# Patient Record
Sex: MALE | Race: WHITE | NOT HISPANIC OR LATINO | Employment: FULL TIME | URBAN - METROPOLITAN AREA
[De-identification: names, ages, dates, MRNs, and addresses within clinical notes are randomized per-mention and may not be internally consistent; named-entity substitution may affect disease eponyms.]

---

## 2023-02-02 PROBLEM — H52.7 REFRACTIVE ERROR: Status: ACTIVE | Noted: 2023-02-02

## 2023-02-02 PROBLEM — K21.00 CHRONIC REFLUX ESOPHAGITIS: Status: ACTIVE | Noted: 2023-02-02

## 2023-02-02 PROBLEM — H35.373 EPIRETINAL MEMBRANE (ERM) OF BOTH EYES: Status: ACTIVE | Noted: 2023-02-02

## 2023-02-02 PROBLEM — H52.13 MYOPIA OF BOTH EYES WITH ASTIGMATISM AND PRESBYOPIA: Status: ACTIVE | Noted: 2023-02-02

## 2023-02-02 PROBLEM — H52.203 MYOPIA OF BOTH EYES WITH ASTIGMATISM AND PRESBYOPIA: Status: ACTIVE | Noted: 2023-02-02

## 2023-02-02 PROBLEM — H90.A22 SENSORINEURAL HEARING LOSS (SNHL) OF LEFT EAR WITH RESTRICTED HEARING OF RIGHT EAR: Status: ACTIVE | Noted: 2023-02-02

## 2023-02-02 PROBLEM — J30.9 ALLERGIC RHINITIS: Status: ACTIVE | Noted: 2023-02-02

## 2023-02-02 PROBLEM — H52.13 BILATERAL MYOPIA: Status: ACTIVE | Noted: 2023-02-02

## 2023-02-02 PROBLEM — H52.203 ASTIGMATISM, BILATERAL: Status: ACTIVE | Noted: 2023-02-02

## 2023-02-02 PROBLEM — K21.9 GERD WITHOUT ESOPHAGITIS: Status: ACTIVE | Noted: 2023-02-02

## 2023-02-02 PROBLEM — R42 VERTIGO: Status: ACTIVE | Noted: 2023-02-02

## 2023-02-02 PROBLEM — H90.3 BILATERAL SENSORINEURAL HEARING LOSS: Status: ACTIVE | Noted: 2023-02-02

## 2023-02-02 PROBLEM — R53.83 FATIGUE: Status: ACTIVE | Noted: 2023-02-02

## 2023-02-02 PROBLEM — H93.19 SUBJECTIVE TINNITUS: Status: ACTIVE | Noted: 2023-02-02

## 2023-02-02 PROBLEM — H43.811 PVD (POSTERIOR VITREOUS DETACHMENT), RIGHT EYE: Status: ACTIVE | Noted: 2023-02-02

## 2023-02-02 PROBLEM — H93.12 SUBJECTIVE TINNITUS OF LEFT EAR: Status: ACTIVE | Noted: 2023-02-02

## 2023-02-02 PROBLEM — H61.21 IMPACTED CERUMEN OF RIGHT EAR: Status: ACTIVE | Noted: 2023-02-02

## 2023-02-02 PROBLEM — N41.0 ACUTE PROSTATITIS: Status: ACTIVE | Noted: 2023-02-02

## 2023-02-02 PROBLEM — R07.9 CHEST PAIN: Status: ACTIVE | Noted: 2023-02-02

## 2023-02-02 PROBLEM — H93.8X2 FULLNESS IN EAR, LEFT: Status: ACTIVE | Noted: 2023-02-02

## 2023-02-02 PROBLEM — K21.9 GERD (GASTROESOPHAGEAL REFLUX DISEASE): Status: ACTIVE | Noted: 2023-02-02

## 2023-02-02 PROBLEM — H43.813 PVD (POSTERIOR VITREOUS DETACHMENT), BOTH EYES: Status: ACTIVE | Noted: 2023-02-02

## 2023-02-02 PROBLEM — H52.4 MYOPIA OF BOTH EYES WITH ASTIGMATISM AND PRESBYOPIA: Status: ACTIVE | Noted: 2023-02-02

## 2023-02-02 PROBLEM — H52.4 BILATERAL PRESBYOPIA: Status: ACTIVE | Noted: 2023-02-02

## 2023-02-02 PROBLEM — H35.371 MACULAR PUCKER, RIGHT EYE: Status: ACTIVE | Noted: 2023-02-02

## 2023-02-02 PROBLEM — H81.02: Status: ACTIVE | Noted: 2023-02-02

## 2023-02-02 RX ORDER — OMEPRAZOLE 20 MG/1
1 CAPSULE, DELAYED RELEASE ORAL DAILY
COMMUNITY
Start: 2014-01-07

## 2023-03-16 ENCOUNTER — OFFICE VISIT (OUTPATIENT)
Dept: PRIMARY CARE | Facility: CLINIC | Age: 63
End: 2023-03-16
Payer: COMMERCIAL

## 2023-03-16 VITALS — SYSTOLIC BLOOD PRESSURE: 126 MMHG | DIASTOLIC BLOOD PRESSURE: 73 MMHG | WEIGHT: 162 LBS | BODY MASS INDEX: 23.92 KG/M2

## 2023-03-16 DIAGNOSIS — K21.9 GERD WITHOUT ESOPHAGITIS: ICD-10-CM

## 2023-03-16 DIAGNOSIS — Z00.00 HEALTH CARE MAINTENANCE: Primary | ICD-10-CM

## 2023-03-16 DIAGNOSIS — E78.2 MIXED HYPERLIPIDEMIA: ICD-10-CM

## 2023-03-16 LAB
ANION GAP IN SER/PLAS: 13 MMOL/L (ref 10–20)
CALCIDIOL (25 OH VITAMIN D3) (NG/ML) IN SER/PLAS: 73 NG/ML
CALCIUM (MG/DL) IN SER/PLAS: 10 MG/DL (ref 8.6–10.6)
CARBON DIOXIDE, TOTAL (MMOL/L) IN SER/PLAS: 31 MMOL/L (ref 21–32)
CHLORIDE (MMOL/L) IN SER/PLAS: 101 MMOL/L (ref 98–107)
CHOLESTEROL (MG/DL) IN SER/PLAS: 178 MG/DL (ref 0–199)
CHOLESTEROL IN HDL (MG/DL) IN SER/PLAS: 44.3 MG/DL
CHOLESTEROL/HDL RATIO: 4
CREATININE (MG/DL) IN SER/PLAS: 1.14 MG/DL (ref 0.5–1.3)
ERYTHROCYTE DISTRIBUTION WIDTH (RATIO) BY AUTOMATED COUNT: 11.9 % (ref 11.5–14.5)
ERYTHROCYTE MEAN CORPUSCULAR HEMOGLOBIN CONCENTRATION (G/DL) BY AUTOMATED: 33.8 G/DL (ref 32–36)
ERYTHROCYTE MEAN CORPUSCULAR VOLUME (FL) BY AUTOMATED COUNT: 93 FL (ref 80–100)
ERYTHROCYTES (10*6/UL) IN BLOOD BY AUTOMATED COUNT: 5.12 X10E12/L (ref 4.5–5.9)
GFR MALE: 73 ML/MIN/1.73M2
GLUCOSE (MG/DL) IN SER/PLAS: 91 MG/DL (ref 74–99)
HEMATOCRIT (%) IN BLOOD BY AUTOMATED COUNT: 47.6 % (ref 41–52)
HEMOGLOBIN (G/DL) IN BLOOD: 16.1 G/DL (ref 13.5–17.5)
LDL: 82 MG/DL (ref 0–99)
LEUKOCYTES (10*3/UL) IN BLOOD BY AUTOMATED COUNT: 5.2 X10E9/L (ref 4.4–11.3)
NON HDL CHOLESTEROL: 134 MG/DL
NRBC (PER 100 WBCS) BY AUTOMATED COUNT: 0 /100 WBC (ref 0–0)
PLATELETS (10*3/UL) IN BLOOD AUTOMATED COUNT: 206 X10E9/L (ref 150–450)
POTASSIUM (MMOL/L) IN SER/PLAS: 4.3 MMOL/L (ref 3.5–5.3)
PROSTATE SPECIFIC ANTIGEN,SCREEN: 0.49 NG/ML (ref 0–4)
SODIUM (MMOL/L) IN SER/PLAS: 141 MMOL/L (ref 136–145)
TRIGLYCERIDE (MG/DL) IN SER/PLAS: 258 MG/DL (ref 0–149)
UREA NITROGEN (MG/DL) IN SER/PLAS: 16 MG/DL (ref 6–23)
VLDL: 52 MG/DL (ref 0–40)

## 2023-03-16 PROCEDURE — 84153 ASSAY OF PSA TOTAL: CPT

## 2023-03-16 PROCEDURE — 80061 LIPID PANEL: CPT

## 2023-03-16 PROCEDURE — 99396 PREV VISIT EST AGE 40-64: CPT | Performed by: INTERNAL MEDICINE

## 2023-03-16 PROCEDURE — 80048 BASIC METABOLIC PNL TOTAL CA: CPT

## 2023-03-16 PROCEDURE — 82306 VITAMIN D 25 HYDROXY: CPT

## 2023-03-16 PROCEDURE — 85027 COMPLETE CBC AUTOMATED: CPT

## 2023-03-16 PROCEDURE — 93000 ELECTROCARDIOGRAM COMPLETE: CPT | Performed by: INTERNAL MEDICINE

## 2023-03-16 PROCEDURE — 36415 COLL VENOUS BLD VENIPUNCTURE: CPT | Performed by: INTERNAL MEDICINE

## 2023-03-16 NOTE — PROGRESS NOTES
Subjective   Patient ID: Vahe Phillips is a 62 y.o. male who presents for No chief complaint on file..    HPI CPE see updated front sheet no chest pain no shortness of breath feels he cannot be without the omeprazole it does help him no side effect with medication bowels normal no dysuria bones muscles joints okay occasionally will have a tremor in the morning but not throughout the day has some persistent postnasal drip    Past medical history hyperlipidemia    Medication omeprazole flavonoids creatine    Allergies noted and unchanged    Social history no tobacco no alcohol rare    Family history noted and unchanged    Prevention exercising vigorously prior blood work reviewed colonoscopy every 5 years    Review of Systems    Objective   There were no vitals taken for this visit.    Physical Exam vital signs noted alert and oriented x3 NCAT PERRLA EOMI nares without discharge OP benign TM normal bilateral EAC clear bilateral minimal cerumen on right no AC nodes no JVD or bruit no thyromegaly chest clear to auscultation and percussion CV regular rate and rhythm S1-S2 without murmur gallop or rub abdomen soft nontender normal active bowel sounds no rebound or guarding no HSM LS spine normal curvature negative straight leg raise negative logroll negative SI joint tenderness extremities no clubbing cyanosis or edema normal distal pulses DTR 2+ impression General medical examination    Assessment/Plan General medical examination check EKG advised on heart check Chem-7 advised on glucose potassium and kidney function postnasal drip tremor hyperlipidemia GERD  Plan advised on increase water consumption with the [employer] of the creatine check CBC advised on blood count follow-up for next colonoscopy check lipid panel advised on cholesterol profile check vitamin D level by request advised on supplementation check PSA advised on prostate no intervention is needed for the minimal brief tremor no medication is actually needed  for the postnasal drip is he is content without medication does not seem to be allergic in nature may revisit continue with PPI and avoid NSAIDs recheck 6 months based on above TT 60 cc 31

## 2023-03-29 ENCOUNTER — TELEMEDICINE (OUTPATIENT)
Dept: PRIMARY CARE | Facility: CLINIC | Age: 63
End: 2023-03-29
Payer: COMMERCIAL

## 2023-03-29 DIAGNOSIS — R73.09 ELEVATED GLUCOSE LEVEL: ICD-10-CM

## 2023-03-29 DIAGNOSIS — E78.1 HYPERTRIGLYCERIDEMIA: Primary | ICD-10-CM

## 2023-03-29 PROCEDURE — 99442 PR PHYS/QHP TELEPHONE EVALUATION 11-20 MIN: CPT | Performed by: INTERNAL MEDICINE

## 2023-03-29 NOTE — PROGRESS NOTES
Subjective   Patient ID: Vahe Phillips is a 62 y.o. male who presents for No chief complaint on file..    HPI patient initiated telehealth visit this visit was completed via telephone secondary to the restrictions of the COVID-19 pandemic all medical issues were addressed and discussed with patient patient was not otherwise examined if it was felt that the patient needed to be seen in the office they would have been referred to do so patient verbally consented to visit  Sick visit discussed with cholesterol VLDL total cholesterol and triglycerides as well as previously slightly elevated glucose had come into line    Review of Systems    Objective   There were no vitals taken for this visit.    Physical Exam alert and oriented x3 breathing unlabored not otherwise examined    Assessment/Plan impression glucose diagnosis if any cholesterol diagnoses       Plan continue good diet watching cho and may recheck glucose in the future increase aerobic exercise weight control good water consumption recheck on lipids discussed with medications and non pharm for now and follow-up more regularly

## 2023-12-14 ENCOUNTER — OFFICE VISIT (OUTPATIENT)
Dept: OPHTHALMOLOGY | Facility: CLINIC | Age: 63
End: 2023-12-14
Payer: COMMERCIAL

## 2023-12-14 DIAGNOSIS — H35.371 MACULAR PUCKER, RIGHT EYE: Primary | ICD-10-CM

## 2023-12-14 DIAGNOSIS — H52.13 BILATERAL MYOPIA: ICD-10-CM

## 2023-12-14 DIAGNOSIS — H52.223 REGULAR ASTIGMATISM OF BOTH EYES: ICD-10-CM

## 2023-12-14 DIAGNOSIS — H35.373 EPIRETINAL MEMBRANE (ERM) OF BOTH EYES: ICD-10-CM

## 2023-12-14 DIAGNOSIS — H43.813 PVD (POSTERIOR VITREOUS DETACHMENT), BOTH EYES: ICD-10-CM

## 2023-12-14 PROCEDURE — 92134 CPTRZ OPH DX IMG PST SGM RTA: CPT | Mod: BILATERAL PROCEDURE | Performed by: OPTOMETRIST

## 2023-12-14 PROCEDURE — 92015 DETERMINE REFRACTIVE STATE: CPT | Performed by: OPTOMETRIST

## 2023-12-14 PROCEDURE — 92014 COMPRE OPH EXAM EST PT 1/>: CPT | Performed by: OPTOMETRIST

## 2023-12-14 ASSESSMENT — ENCOUNTER SYMPTOMS
CONSTITUTIONAL NEGATIVE: 0
NEUROLOGICAL NEGATIVE: 0
MUSCULOSKELETAL NEGATIVE: 0
RESPIRATORY NEGATIVE: 0
PSYCHIATRIC NEGATIVE: 0
ENDOCRINE NEGATIVE: 0
GASTROINTESTINAL NEGATIVE: 0
ALLERGIC/IMMUNOLOGIC NEGATIVE: 0
CARDIOVASCULAR NEGATIVE: 0
HEMATOLOGIC/LYMPHATIC NEGATIVE: 0
EYES NEGATIVE: 0

## 2023-12-14 ASSESSMENT — VISUAL ACUITY
OD_SC: 20/25-
METHOD: SNELLEN - LINEAR
OS_SC: 20/25-1

## 2023-12-14 ASSESSMENT — CONF VISUAL FIELD
OS_INFERIOR_NASAL_RESTRICTION: 0
OS_SUPERIOR_NASAL_RESTRICTION: 0
OS_NORMAL: 1
OD_NORMAL: 1
OD_SUPERIOR_TEMPORAL_RESTRICTION: 0
OD_INFERIOR_NASAL_RESTRICTION: 0
OD_INFERIOR_TEMPORAL_RESTRICTION: 0
OS_INFERIOR_TEMPORAL_RESTRICTION: 0
OD_SUPERIOR_NASAL_RESTRICTION: 0
OS_SUPERIOR_TEMPORAL_RESTRICTION: 0
METHOD: COUNTING FINGERS

## 2023-12-14 ASSESSMENT — EXTERNAL EXAM - RIGHT EYE: OD_EXAM: NORMAL

## 2023-12-14 ASSESSMENT — CUP TO DISC RATIO
OD_RATIO: .3
OS_RATIO: .3

## 2023-12-14 ASSESSMENT — REFRACTION_MANIFEST
OD_SPHERE: +0.25
OS_ADD: +2.50
OD_AXIS: 070
OD_ADD: +2.50
OS_SPHERE: +0.25
OS_CYLINDER: -0.75
OD_SPHERE: +0.25
METHOD_AUTOREFRACTION: 1
OS_AXIS: 153
OD_CYLINDER: -0.50
OS_CYLINDER: --0.75
OD_CYLINDER: -0.25
OS_SPHERE: PLANO
OD_AXIS: 004
OS_AXIS: 151

## 2023-12-14 ASSESSMENT — TONOMETRY
OS_IOP_MMHG: 16
IOP_METHOD: GOLDMANN APPLANATION
OD_IOP_MMHG: 16

## 2023-12-14 ASSESSMENT — SLIT LAMP EXAM - LIDS
COMMENTS: GOOD POSITION
COMMENTS: GOOD POSITION

## 2023-12-14 ASSESSMENT — EXTERNAL EXAM - LEFT EYE: OS_EXAM: NORMAL

## 2023-12-14 NOTE — PROGRESS NOTES
VA stable at 20/20 O and OS.  ERM stable and Optical coherence tomography of the macula revealed:    OD: ERM, abnormal foveal contour, photoreceptor, retinal pigment epithelium, IS/OS junction, central field 448 was 438 was 446 was 439 micron.  Vitreous base NOT visualized.   OS: ERM, abnormal foveal contour, photoreceptor, retinal pigment epithelium, IS/OS junction, central field 427 was 404 was 388 was 380 micron.  Vitreous base NOT visualized.   VA tests at 20/25 OD and 20/20 OS, 20/20 in each eye.  Mild subjective symptoms associated.  NS minimal.  A spectacle prescription was deferred at the patient`s request.  Patient may not fill as he sees well without.  The patient was asked to return to our clinic in one year   or sooner if ocular or vision changes occur.     C/O dry eyes start iVizia 2-4x/day.     1 year VA MR and DFE and OCT macula.

## 2024-03-01 ENCOUNTER — APPOINTMENT (OUTPATIENT)
Dept: OPHTHALMOLOGY | Facility: CLINIC | Age: 64
End: 2024-03-01
Payer: COMMERCIAL

## 2024-04-05 ENCOUNTER — APPOINTMENT (OUTPATIENT)
Dept: OPHTHALMOLOGY | Facility: CLINIC | Age: 64
End: 2024-04-05
Payer: COMMERCIAL

## 2024-04-16 ENCOUNTER — LAB (OUTPATIENT)
Dept: LAB | Facility: LAB | Age: 64
End: 2024-04-16
Payer: COMMERCIAL

## 2024-04-16 ENCOUNTER — OFFICE VISIT (OUTPATIENT)
Dept: PRIMARY CARE | Facility: CLINIC | Age: 64
End: 2024-04-16
Payer: COMMERCIAL

## 2024-04-16 VITALS — BODY MASS INDEX: 23.92 KG/M2 | WEIGHT: 162 LBS | SYSTOLIC BLOOD PRESSURE: 114 MMHG | DIASTOLIC BLOOD PRESSURE: 68 MMHG

## 2024-04-16 DIAGNOSIS — K21.9 GERD WITHOUT ESOPHAGITIS: ICD-10-CM

## 2024-04-16 DIAGNOSIS — Z00.00 HEALTH CARE MAINTENANCE: Primary | ICD-10-CM

## 2024-04-16 DIAGNOSIS — Z00.00 HEALTH CARE MAINTENANCE: ICD-10-CM

## 2024-04-16 DIAGNOSIS — J30.1 ALLERGIC RHINITIS DUE TO POLLEN, UNSPECIFIED SEASONALITY: ICD-10-CM

## 2024-04-16 DIAGNOSIS — E78.2 MIXED HYPERLIPIDEMIA: ICD-10-CM

## 2024-04-16 LAB
CHOLEST SERPL-MCNC: 182 MG/DL (ref 0–199)
CHOLESTEROL/HDL RATIO: 4.3
HDLC SERPL-MCNC: 42.6 MG/DL
LDLC SERPL CALC-MCNC: 93 MG/DL
NON HDL CHOLESTEROL: 139 MG/DL (ref 0–149)
TRIGL SERPL-MCNC: 232 MG/DL (ref 0–149)
VLDL: 46 MG/DL (ref 0–40)

## 2024-04-16 PROCEDURE — 99396 PREV VISIT EST AGE 40-64: CPT | Performed by: INTERNAL MEDICINE

## 2024-04-16 PROCEDURE — 80061 LIPID PANEL: CPT

## 2024-04-16 PROCEDURE — 85027 COMPLETE CBC AUTOMATED: CPT

## 2024-04-16 PROCEDURE — 84153 ASSAY OF PSA TOTAL: CPT

## 2024-04-16 PROCEDURE — 80048 BASIC METABOLIC PNL TOTAL CA: CPT

## 2024-04-16 PROCEDURE — 36415 COLL VENOUS BLD VENIPUNCTURE: CPT

## 2024-04-16 NOTE — PROGRESS NOTES
Subjective   Patient ID: Vahe Phillips is a 63 y.o. female who presents for No chief complaint on file..    HPI CPE see updated front sheet some dry eyes and some postnasal drip has been using Systane and Zyrtec with some success stomach has been okay using omeprazole twice per week occasionally Tums or Pepcid bowels normal sometimes up at night to urinate swallowing has been okay bones muscles joints okay    Past medical history hyperlipidemia GERD    Medication omeprazole flavonoids creatine Pepcid times    Allergies noted and unchanged    Social history no tobacco no alcohol rare    Family history noted and unchanged    Prevention exercising vigorously prior blood work reviewed colonoscopy every 5 years has had he GD    Review of Systems    Objective   There were no vitals taken for this visit.    Physical Exam vital signs noted alert and oriented x3 NCAT PERRLA EOMI eyes not completely dry nares clear discharge OP benign TM normal bilateral EAC clear bilateral no AC nodes no JVD or bruit no thyromegaly chest clear to auscultation and percussion CV regular rate and rhythm S1-S2 without murmur gallop or rub abdomen soft nontender normal active bowel sounds no rebound or guarding no HSM LS spine normal curvature negative straight leg raise negative logroll negative SI joint tenderness extremities no clubbing cyanosis or edema normal distal pulses DTR 2+     Assessment/Plan impression General medical examination GERD hyperlipidemia dry eyes allergic rhinitis  Plan check coronary artery calcium CT score requisition made advised on heart check Chem-7 advised on glucose potassium and kidney function postnasal drip okay for Zyrtec continue with the Systane follow-up with ophthalmology good diet regular exercise increase water consumption decrease water consumption at night for the prostate check CBC advised on blood count follow-up for next colonoscopy and EGD check lipid panel advised on cholesterol profile check PSA  advised on prostate no intervention is needed for the prostate continue with PPI and avoid NSAIDs recheck 6 months based on above TT 60 cc 31

## 2024-04-18 ENCOUNTER — APPOINTMENT (OUTPATIENT)
Dept: OPHTHALMOLOGY | Facility: CLINIC | Age: 64
End: 2024-04-18
Payer: COMMERCIAL

## 2024-04-19 LAB
ANION GAP SERPL CALC-SCNC: 13 MMOL/L (ref 10–20)
BUN SERPL-MCNC: 15 MG/DL (ref 6–23)
CALCIUM SERPL-MCNC: 9.1 MG/DL (ref 8.6–10.6)
CHLORIDE SERPL-SCNC: 105 MMOL/L (ref 98–107)
CO2 SERPL-SCNC: 29 MMOL/L (ref 21–32)
CREAT SERPL-MCNC: 1.03 MG/DL (ref 0.5–1.3)
EGFRCR SERPLBLD CKD-EPI 2021: 82 ML/MIN/1.73M*2
ERYTHROCYTE [DISTWIDTH] IN BLOOD BY AUTOMATED COUNT: 11.8 % (ref 11.5–14.5)
GLUCOSE SERPL-MCNC: 105 MG/DL (ref 74–99)
HCT VFR BLD AUTO: 47 % (ref 41–52)
HGB BLD-MCNC: 16.2 G/DL (ref 13.5–17.5)
MCH RBC QN AUTO: 32.5 PG (ref 26–34)
MCHC RBC AUTO-ENTMCNC: 34.5 G/DL (ref 32–36)
MCV RBC AUTO: 94 FL (ref 80–100)
NRBC BLD-RTO: 0 /100 WBCS (ref 0–0)
PLATELET # BLD AUTO: 191 X10*3/UL (ref 150–450)
POTASSIUM SERPL-SCNC: 4.6 MMOL/L (ref 3.5–5.3)
PSA SERPL-MCNC: 0.63 NG/ML
RBC # BLD AUTO: 4.99 X10*6/UL (ref 4.5–5.9)
SODIUM SERPL-SCNC: 142 MMOL/L (ref 136–145)
WBC # BLD AUTO: 4.2 X10*3/UL (ref 4.4–11.3)

## 2024-05-01 ENCOUNTER — DOCUMENTATION (OUTPATIENT)
Dept: OPHTHALMOLOGY | Facility: CLINIC | Age: 64
End: 2024-05-01
Payer: COMMERCIAL

## 2024-05-03 ENCOUNTER — APPOINTMENT (OUTPATIENT)
Dept: OPHTHALMOLOGY | Facility: CLINIC | Age: 64
End: 2024-05-03
Payer: COMMERCIAL

## 2024-07-09 ENCOUNTER — HOSPITAL ENCOUNTER (OUTPATIENT)
Dept: RADIOLOGY | Facility: HOSPITAL | Age: 64
Discharge: HOME | End: 2024-07-09
Payer: COMMERCIAL

## 2024-07-09 DIAGNOSIS — E78.2 MIXED HYPERLIPIDEMIA: ICD-10-CM

## 2024-07-09 DIAGNOSIS — Z00.00 HEALTH CARE MAINTENANCE: ICD-10-CM

## 2024-07-09 PROCEDURE — 75571 CT HRT W/O DYE W/CA TEST: CPT

## 2024-07-15 ENCOUNTER — APPOINTMENT (OUTPATIENT)
Dept: RADIOLOGY | Facility: HOSPITAL | Age: 64
End: 2024-07-15
Payer: COMMERCIAL

## 2024-07-23 ENCOUNTER — APPOINTMENT (OUTPATIENT)
Dept: PRIMARY CARE | Facility: CLINIC | Age: 64
End: 2024-07-23
Payer: COMMERCIAL

## 2024-07-23 VITALS — SYSTOLIC BLOOD PRESSURE: 123 MMHG | DIASTOLIC BLOOD PRESSURE: 72 MMHG

## 2024-07-23 DIAGNOSIS — E78.2 MIXED HYPERLIPIDEMIA: ICD-10-CM

## 2024-07-23 DIAGNOSIS — K76.89 HEPATIC CYST: ICD-10-CM

## 2024-07-23 DIAGNOSIS — R93.1 ELEVATED CORONARY ARTERY CALCIUM SCORE: Primary | ICD-10-CM

## 2024-07-23 PROCEDURE — 99213 OFFICE O/P EST LOW 20 MIN: CPT | Performed by: INTERNAL MEDICINE

## 2024-07-23 NOTE — PROGRESS NOTES
Subjective   Patient ID: Vahe Phillips is a 63 y.o. male who presents for No chief complaint on file..    HPI blood pressureFollow-up visit no chest pain no shortness of breath no side effect with medication discussed with prior sugar glucose and cholesterol levels as well as coronary artery calcium CT score and findings in the liver    Review of Systems    Objective   There were no vitals taken for this visit.    Physical Exam vital signs noted alert and oriented x 3 NCAT no JVD or bruit chest clear to auscultation CV regular rate and rhythm S1-S2 extremities no clubbing cyanosis or edema normal distal pulses    Assessment/Plan        Impression hyperlipidemia increase coronary artery calcium CT score hepatic cyst?  Plan continue to watch diet regular exercise follow-up with cardiology requisition made we reviewed the hepatic portion of the study and scheduled for ultrasound advised on the potential for medication or other tests or intervention and is okay with proceeding    Finish hepatic ultrasound?

## 2024-09-13 NOTE — PROGRESS NOTES
"Primary Care Physician: Nghia Dominique MD  Date of Visit: 09/18/2024  8:00 AM EDT      Chief Complaint:   Pt referred by Dr. Dominique for high coronary calcium score     HPI / Summary:   Vahe Phillips is a 63 y.o. male with GERD, CAD based on elevated calcium score    Returns for further evaluation.  I saw him once in 2020 for atypical chest pain that was felt to be NEETA related as he had stopped his omeprazole.  He reports overall feeling quite good.  Denies any chest pain or pressure.  No shortness of breath, lightheadedness, dizziness, presyncope or syncope.    \"I'm not a big pill person\"  Takes omeprazole for reflux PRN  Exercise: Yoga, light lifting, light cardio. Takes creatine    Last Cardiology Tests:  ECG: 3/16/23  9/18/2024: NSR, normal ECG  Echo:   None  Cath:  None    Stress Test:  None  Cardiac Imaging: Calcium Scoring 7/9/24  1. Coronary artery calcium score of 239.6 (, LCx 13, RCA 9)        Past Medical History:  Past Medical History:   Diagnosis Date    Cataract     Personal history of other diseases of the digestive system     History of esophageal reflux        Past Surgical History:  Past Surgical History:   Procedure Laterality Date    KNEE SURGERY  12/02/2014    Knee Surgery    OTHER SURGICAL HISTORY  12/02/2014    Blood Vessel Repair Direct Leg          Social History:  He has no history on file for tobacco use, alcohol use, and drug use.    Family History:  family history includes Cataracts in his father and mother; Diabetes in his father.      Allergies:  Allergies   Allergen Reactions    Nut - Unspecified Unknown    Pollen Extracts Unknown       Outpatient Medications:  Current Outpatient Medications   Medication Instructions    omeprazole (PriLOSEC) 20 mg DR capsule 1 capsule, oral, Daily       Review of Systems:  A complete 10 point ROS was performed and is negative except for HPI    Physical Exam:  GENERAL: alert, cooperative, pleasant, in no acute distress  SKIN: warm, dry, no " "rash.  NECK: no JVD, no GERA  CARDIAC: Regular rate and rhythm with no rubs, murmurs, or gallops  CHEST: Normal respiratory efforts, lungs clear to auscultation bilaterally.  ABDOMEN: soft, nontender, nondistended  EXTREMITIES: no edema  NEURO: Alert and oriented x 3.  Grossly normal.  Moves all 4 extremities.    Vitals:    09/18/24 0813   BP: 143/80   BP Location: Left arm   Pulse: 58   SpO2: 98%   Weight: 72.6 kg (160 lb)     Wt Readings from Last 5 Encounters:   04/16/24 73.5 kg (162 lb)   03/16/23 73.5 kg (162 lb)   11/10/21 71.7 kg (158 lb)   12/01/20 72.6 kg (160 lb)   09/11/20 72.3 kg (159 lb 8 oz)     Body mass index is 23.63 kg/m².        Last Labs:  CMP:  Recent Labs     04/16/24  0821 03/16/23  0817 08/18/21  1516    141 133*   K 4.6 4.3 4.2    101 97*   CO2 29 31 27   ANIONGAP 13 13 13   BUN 15 16 12   CREATININE 1.03 1.14 0.76   EGFR 82  --   --    GLUCOSE 105* 91 131*     Recent Labs     08/18/21  1516 09/11/20  0904   ALBUMIN 3.2* 4.2   ALKPHOS 44 55   ALT 37 29   AST 43* 22   BILITOT 1.1 0.5     CBC:  Recent Labs     04/16/24  0821 03/16/23  0817 08/18/21  1516   WBC 4.2* 5.2 5.1   HGB 16.2 16.1 14.4   HCT 47.0 47.6 41.1    206 204   MCV 94 93 91     COAG: No results for input(s): \"INR\", \"DDIMERVTE\" in the last 90179 hours.  ENDO:No results for input(s): \"TSH\", \"HGBA1C\" in the last 97744 hours.   CARDIAC: No results for input(s): \"CKMB\", \"TROPHS\", \"BNP\" in the last 91557 hours.    No lab exists for component: \"CK\", \"CKMBP\"  Recent Labs     04/16/24  0821 03/16/23  0817 11/10/21  0840 09/11/20  0904   CHOL 182 178 215* 168   LDLF  --  82 116* 95   LDLCALC 93  --   --   --    HDL 42.6 44.3 53.0 46.6   TRIG 232* 258* 232* 133       SAHU risk score 9.6%        Assessment/Plan   63-year-old male with history of GERD (taking omeprazole as needed) and CAD based on moderately elevated coronary calcium score of 263.  No ischemic symptoms.  Focus on primary prevention which overall his risk " factors are well-controlled.  Herrera risk score is 9.6% and goal LDL less than 70 in my opinion.  We reviewed risk benefits alternatives to statin and I recommended rosuvastatin 10 mg 3 times a week for him to get LDL to goal.  He would like to think about it and plans to get back to me.  No absolute indication for aspirin at this time and so we will defer.  Plan to follow-up as needed.    Followup Appts:  Future Appointments   Date Time Provider Department Center   12/19/2024  8:30 AM Db Conti OD [id number] [facility]           ____________________________________________________________  Alon Ye DO  [facility] [place]  [facility]

## 2024-09-18 ENCOUNTER — HOSPITAL ENCOUNTER (OUTPATIENT)
Dept: CARDIOLOGY | Facility: HOSPITAL | Age: 64
Discharge: HOME | End: 2024-09-18
Payer: COMMERCIAL

## 2024-09-18 ENCOUNTER — APPOINTMENT (OUTPATIENT)
Dept: CARDIOLOGY | Facility: CLINIC | Age: 64
End: 2024-09-18
Payer: COMMERCIAL

## 2024-09-18 ENCOUNTER — OFFICE VISIT (OUTPATIENT)
Dept: CARDIOLOGY | Facility: CLINIC | Age: 64
End: 2024-09-18
Payer: COMMERCIAL

## 2024-09-18 VITALS
HEART RATE: 58 BPM | WEIGHT: 160 LBS | SYSTOLIC BLOOD PRESSURE: 143 MMHG | DIASTOLIC BLOOD PRESSURE: 80 MMHG | OXYGEN SATURATION: 98 % | BODY MASS INDEX: 23.63 KG/M2

## 2024-09-18 DIAGNOSIS — R93.1 ELEVATED CORONARY ARTERY CALCIUM SCORE: Primary | ICD-10-CM

## 2024-09-18 DIAGNOSIS — E78.5 HYPERLIPIDEMIA LDL GOAL <70: ICD-10-CM

## 2024-09-18 DIAGNOSIS — R93.1 ELEVATED CORONARY ARTERY CALCIUM SCORE: ICD-10-CM

## 2024-09-18 PROCEDURE — 99214 OFFICE O/P EST MOD 30 MIN: CPT | Performed by: INTERNAL MEDICINE

## 2024-09-18 NOTE — PATIENT INSTRUCTIONS
Today we reviewed that you have a moderately elevated coronary artery calcium score (between 100 and 300).      This puts you in the moderate risk category of heart attack or stroke over the next 10 years (~10%).      I would recommend following: Rosuvastatin 10 mg 3 days a week (Monday, Wednesday, Friday).  This will reduce your risk of heart attack and stroke by approximately 20%.  Goal LDL is less than 70.  Currently it is at 93.    We also focus on primary prevention which means we recommend  -No smoking  -Diabetes if present is controlled (HgA1c levels <7%  - blood pressure if present is controlled (goal <130/80 mmHg)  -heart healthy eating (as described in detail below) and   -focus on lowering cholesterol levels with an LDL cholesterol goal of less than 70.     You do not ever need another coronary calcium score as it will not change decision making in my opinion.    Diet recommendations:  1. EAT a mostly plant based diet (vegetarian or vegan) like the Mediterranean diet. By adhering to this diet you significantly reduce your risk of heart attack, stroke, diabetes, cancer and kidney disease. There is also increased chance of you getting off medications. Handout provided.  2. Avoid processed foods  3. If you are not confident in making a major diet shift at least consider…   a. Reducing or remove sugary drinks( pop, juices) and drink more water   b. Avoid cheese   c. Avoid fast food.   d. Minimal (1 drink) or no alcohol consumption  e. Eat more fruits and vegetables. Eating more fruit will NOT make diabetes worse or increase  chances of getting diabetes   f. Eat more fiber (>25 grams a day, and not from supplements).     Recommend follow-up as needed or if you would like to return in 1 year we can review future options    If you decide to pursue rosuvastatin medication please contact us and we will be more than happy to send in prescription for you

## 2024-09-19 LAB
ATRIAL RATE: 61 BPM
P AXIS: 72 DEGREES
P OFFSET: 186 MS
P ONSET: 142 MS
PR INTERVAL: 154 MS
Q ONSET: 219 MS
QRS COUNT: 11 BEATS
QRS DURATION: 88 MS
QT INTERVAL: 398 MS
QTC CALCULATION(BAZETT): 400 MS
QTC FREDERICIA: 399 MS
R AXIS: 71 DEGREES
T AXIS: 51 DEGREES
T OFFSET: 418 MS
VENTRICULAR RATE: 61 BPM

## 2024-09-19 PROCEDURE — 93005 ELECTROCARDIOGRAM TRACING: CPT

## 2024-12-19 ENCOUNTER — APPOINTMENT (OUTPATIENT)
Dept: OPHTHALMOLOGY | Facility: CLINIC | Age: 64
End: 2024-12-19
Payer: COMMERCIAL

## 2024-12-19 DIAGNOSIS — H52.13 MYOPIA OF BOTH EYES WITH ASTIGMATISM AND PRESBYOPIA: Primary | ICD-10-CM

## 2024-12-19 DIAGNOSIS — H01.02A SQUAMOUS BLEPHARITIS OF UPPER AND LOWER EYELIDS OF BOTH EYES: ICD-10-CM

## 2024-12-19 DIAGNOSIS — H52.203 MYOPIA OF BOTH EYES WITH ASTIGMATISM AND PRESBYOPIA: Primary | ICD-10-CM

## 2024-12-19 DIAGNOSIS — H43.813 PVD (POSTERIOR VITREOUS DETACHMENT), BOTH EYES: ICD-10-CM

## 2024-12-19 DIAGNOSIS — B88.0 INFESTATION BY DEMODEX: ICD-10-CM

## 2024-12-19 DIAGNOSIS — H35.373 EPIRETINAL MEMBRANE (ERM) OF BOTH EYES: ICD-10-CM

## 2024-12-19 DIAGNOSIS — H01.02B SQUAMOUS BLEPHARITIS OF UPPER AND LOWER EYELIDS OF BOTH EYES: ICD-10-CM

## 2024-12-19 DIAGNOSIS — H52.4 MYOPIA OF BOTH EYES WITH ASTIGMATISM AND PRESBYOPIA: Primary | ICD-10-CM

## 2024-12-19 PROCEDURE — 92015 DETERMINE REFRACTIVE STATE: CPT | Performed by: OPTOMETRIST

## 2024-12-19 PROCEDURE — 92014 COMPRE OPH EXAM EST PT 1/>: CPT | Performed by: OPTOMETRIST

## 2024-12-19 PROCEDURE — 92134 CPTRZ OPH DX IMG PST SGM RTA: CPT | Performed by: OPTOMETRIST

## 2024-12-19 RX ORDER — LOTILANER OPHTHALMIC SOLUTION 2.5 MG/ML
1 SOLUTION/ DROPS OPHTHALMIC EVERY 12 HOURS
Qty: 10 ML | Refills: 0 | Status: SHIPPED | OUTPATIENT
Start: 2024-12-19 | End: 2025-01-30

## 2024-12-19 ASSESSMENT — CONF VISUAL FIELD
OS_INFERIOR_NASAL_RESTRICTION: 0
OS_SUPERIOR_TEMPORAL_RESTRICTION: 0
OD_NORMAL: 1
OD_INFERIOR_NASAL_RESTRICTION: 0
OS_INFERIOR_TEMPORAL_RESTRICTION: 0
OD_INFERIOR_TEMPORAL_RESTRICTION: 0
OD_SUPERIOR_TEMPORAL_RESTRICTION: 0
OD_SUPERIOR_NASAL_RESTRICTION: 0
OS_SUPERIOR_NASAL_RESTRICTION: 0
OS_NORMAL: 1

## 2024-12-19 ASSESSMENT — REFRACTION_MANIFEST
OD_ADD: +2.50
OS_ADD: +2.50
OD_CYLINDER: -0.50
OD_SPHERE: PLANO
OS_CYLINDER: -0.50
OS_AXIS: 155
OS_SPHERE: -0.25
OD_AXIS: 080

## 2024-12-19 ASSESSMENT — ENCOUNTER SYMPTOMS
ENDOCRINE NEGATIVE: 0
RESPIRATORY NEGATIVE: 0
NEUROLOGICAL NEGATIVE: 0
CONSTITUTIONAL NEGATIVE: 0
ALLERGIC/IMMUNOLOGIC NEGATIVE: 0
HEMATOLOGIC/LYMPHATIC NEGATIVE: 0
PSYCHIATRIC NEGATIVE: 0
MUSCULOSKELETAL NEGATIVE: 0
GASTROINTESTINAL NEGATIVE: 0
EYES NEGATIVE: 1
CARDIOVASCULAR NEGATIVE: 0

## 2024-12-19 ASSESSMENT — TONOMETRY
OS_IOP_MMHG: 15
OD_IOP_MMHG: 15
IOP_METHOD: GOLDMANN APPLANATION

## 2024-12-19 ASSESSMENT — EXTERNAL EXAM - LEFT EYE: OS_EXAM: NORMAL

## 2024-12-19 ASSESSMENT — VISUAL ACUITY
OS_SC: 20/30
OD_SC+: -2
OS_SC+: -1
METHOD: SNELLEN - LINEAR
OD_SC: 20/20

## 2024-12-19 ASSESSMENT — CUP TO DISC RATIO
OD_RATIO: .3
OS_RATIO: .3

## 2024-12-19 ASSESSMENT — SLIT LAMP EXAM - LIDS
COMMENTS: GOOD POSITION
COMMENTS: GOOD POSITION

## 2024-12-19 ASSESSMENT — EXTERNAL EXAM - RIGHT EYE: OD_EXAM: NORMAL

## 2024-12-19 NOTE — PROGRESS NOTES
VA stable at 20/20 OD and OS.  ERM stable and Optical coherence tomography of the macula revealed:    OD: ERM, abnormal foveal contour, photoreceptor, retinal pigment epithelium, IS/OS junction, central field 436 was 448 was 438 was 446 was 439 micron.  Vitreous base NOT visualized.   OS: ERM, abnormal foveal contour, photoreceptor, retinal pigment epithelium, IS/OS junction, central field 423 was 427 was 404 was 388 was 380 micron.  Vitreous base NOT visualized.     VA tests at 20/20 OD/OS, 20/20.  Mild subjective symptoms associated.      NS minimal.  A spectacle prescription was deferred at the patient`s request.  Patient may not fill as he sees well without.  The patient was asked to return to our clinic in one year   or sooner if ocular or vision changes occur.     C/O dry eyes start iVizia 2-4x/day.     Blepharitis: Meibomain gland disease (Effron scale 0-4, 0:no abnormality, 4: thick creamy yellow expression at all gland orifices, expression continuous, conjunctival redness). Collarettes (0-4, 0: 0-2 lashes with collarettes, 1: 3-10, 2: >10 but <1/3rd of lashes, 3: >1/3rd to <2/3rd of lashes, 4: >2/3rd of lashes.  Right eye (OD): stage 2 collarettes: stage 2  Left eye (OS): stage 2 collarettes: stage 2    Start XDEMVY. Start iVizia at least BID.    6 months VA and OCT macula. Dry eye testing if this has gotten worse.

## 2024-12-20 ENCOUNTER — APPOINTMENT (OUTPATIENT)
Dept: PRIMARY CARE | Facility: CLINIC | Age: 64
End: 2024-12-20
Payer: COMMERCIAL

## 2024-12-20 DIAGNOSIS — Z00.00 WELLNESS EXAMINATION: ICD-10-CM

## 2024-12-20 DIAGNOSIS — Z71.9 ENCOUNTER FOR CONSULTATION: Primary | ICD-10-CM

## 2024-12-20 NOTE — PROGRESS NOTES
Patient presents for meet/greet.  Information regarding practice provided.    Patient states he would be due for wellness exam in April.  Lab work will be ordered in anticipation of future physical.    Jose Hollingsworth MD

## 2025-01-13 PROBLEM — R53.83 FATIGUE: Status: RESOLVED | Noted: 2023-02-02 | Resolved: 2025-01-13

## 2025-01-13 PROBLEM — K20.0 EOSINOPHILIC ESOPHAGITIS: Status: ACTIVE | Noted: 2025-01-13

## 2025-01-13 PROBLEM — R07.9 CHEST PAIN: Status: RESOLVED | Noted: 2023-02-02 | Resolved: 2025-01-13

## 2025-01-13 PROBLEM — H93.8X2 FULLNESS IN EAR, LEFT: Status: RESOLVED | Noted: 2023-02-02 | Resolved: 2025-01-13

## 2025-01-13 PROBLEM — K21.9 GERD WITHOUT ESOPHAGITIS: Status: RESOLVED | Noted: 2023-02-02 | Resolved: 2025-01-13

## 2025-01-13 PROBLEM — Z87.438 HISTORY OF PROSTATITIS: Status: ACTIVE | Noted: 2025-01-13

## 2025-01-13 PROBLEM — H61.20 IMPACTED CERUMEN: Status: ACTIVE | Noted: 2023-02-02

## 2025-01-13 PROBLEM — R93.1 AGATSTON CORONARY ARTERY CALCIUM SCORE BETWEEN 200 AND 399: Status: ACTIVE | Noted: 2025-01-13

## 2025-01-13 PROBLEM — H81.02 COCHLEAR HYDROPS OF LEFT EAR: Status: ACTIVE | Noted: 2025-01-13

## 2025-01-13 PROBLEM — I25.10 CORONARY ARTERIOSCLEROSIS: Status: ACTIVE | Noted: 2025-01-13

## 2025-01-13 PROBLEM — N41.0 ACUTE PROSTATITIS: Status: RESOLVED | Noted: 2023-02-02 | Resolved: 2025-01-13

## 2025-03-11 ENCOUNTER — TELEPHONE (OUTPATIENT)
Dept: PRIMARY CARE | Facility: CLINIC | Age: 65
End: 2025-03-11
Payer: COMMERCIAL

## 2025-03-11 DIAGNOSIS — J01.90 ACUTE SINUSITIS, RECURRENCE NOT SPECIFIED, UNSPECIFIED LOCATION: Primary | ICD-10-CM

## 2025-03-11 RX ORDER — DOXYCYCLINE 100 MG/1
100 CAPSULE ORAL 2 TIMES DAILY
Qty: 20 CAPSULE | Refills: 0 | Status: SHIPPED | OUTPATIENT
Start: 2025-03-11 | End: 2025-03-21

## 2025-03-11 NOTE — TELEPHONE ENCOUNTER
Patient called back.  He has had purulent green nasal discharge with some mild sinus congestion.  Symptoms are not improving and it has been 2 weeks.    Assessment:  Possible secondary acute bacterial sinusitis    Plan:  Doxycycline 100 mg twice a day for 10 days  Saline nasal spray, use as needed/as directed    Jose Hollingsworth MD

## 2025-03-11 NOTE — TELEPHONE ENCOUNTER
Patient sent an email this morning.  He has had upper respiratory infection symptoms which began 2 weeks ago.  He is currently complaining of a lingering runny nose and mild cough.  I have left him a voicemail to inquire if there are any additional details to suggest other upper respiratory infections or secondary bacterial infection, though based on current email it seems less likely.    I have offered patient an office visit as well as have asked him to call back to discuss.    Jose Hollingsworth MD

## 2025-04-15 ENCOUNTER — LAB (OUTPATIENT)
Dept: LAB | Facility: HOSPITAL | Age: 65
End: 2025-04-15
Payer: COMMERCIAL

## 2025-04-15 DIAGNOSIS — Z00.00 ENCOUNTER FOR GENERAL ADULT MEDICAL EXAMINATION WITHOUT ABNORMAL FINDINGS: Primary | ICD-10-CM

## 2025-04-15 DIAGNOSIS — Z00.00 WELLNESS EXAMINATION: ICD-10-CM

## 2025-04-15 LAB
25(OH)D3 SERPL-MCNC: 62 NG/ML (ref 30–100)
ALBUMIN SERPL BCP-MCNC: 4.4 G/DL (ref 3.4–5)
ALP SERPL-CCNC: 53 U/L (ref 33–136)
ALT SERPL W P-5'-P-CCNC: 23 U/L (ref 10–52)
ANION GAP SERPL CALC-SCNC: 11 MMOL/L (ref 10–20)
APPEARANCE UR: CLEAR
AST SERPL W P-5'-P-CCNC: 21 U/L (ref 9–39)
BASOPHILS # BLD AUTO: 0.02 X10*3/UL (ref 0–0.1)
BASOPHILS NFR BLD AUTO: 0.4 %
BILIRUB SERPL-MCNC: 0.7 MG/DL (ref 0–1.2)
BILIRUB UR STRIP.AUTO-MCNC: NEGATIVE MG/DL
BUN SERPL-MCNC: 16 MG/DL (ref 6–23)
CALCIUM SERPL-MCNC: 9.4 MG/DL (ref 8.6–10.6)
CHLORIDE SERPL-SCNC: 103 MMOL/L (ref 98–107)
CHOLEST SERPL-MCNC: 191 MG/DL (ref 0–199)
CHOLESTEROL/HDL RATIO: 4
CO2 SERPL-SCNC: 29 MMOL/L (ref 21–32)
COLOR UR: NORMAL
CREAT SERPL-MCNC: 1.06 MG/DL (ref 0.5–1.3)
CRP SERPL HS-MCNC: 0.3 MG/L
EGFRCR SERPLBLD CKD-EPI 2021: 78 ML/MIN/1.73M*2
EOSINOPHIL # BLD AUTO: 0.33 X10*3/UL (ref 0–0.7)
EOSINOPHIL NFR BLD AUTO: 7.1 %
ERYTHROCYTE [DISTWIDTH] IN BLOOD BY AUTOMATED COUNT: 12 % (ref 11.5–14.5)
EST. AVERAGE GLUCOSE BLD GHB EST-MCNC: 94 MG/DL
GLUCOSE SERPL-MCNC: 101 MG/DL (ref 74–99)
GLUCOSE UR STRIP.AUTO-MCNC: NORMAL MG/DL
HBA1C MFR BLD: 4.9 %
HCT VFR BLD AUTO: 49.7 % (ref 41–52)
HDLC SERPL-MCNC: 47.4 MG/DL
HGB BLD-MCNC: 16.3 G/DL (ref 13.5–17.5)
IMM GRANULOCYTES # BLD AUTO: 0 X10*3/UL (ref 0–0.7)
IMM GRANULOCYTES NFR BLD AUTO: 0 % (ref 0–0.9)
KETONES UR STRIP.AUTO-MCNC: NEGATIVE MG/DL
LDLC SERPL CALC-MCNC: 118 MG/DL
LEUKOCYTE ESTERASE UR QL STRIP.AUTO: NEGATIVE
LYMPHOCYTES # BLD AUTO: 1.89 X10*3/UL (ref 1.2–4.8)
LYMPHOCYTES NFR BLD AUTO: 40.9 %
MCH RBC QN AUTO: 31.7 PG (ref 26–34)
MCHC RBC AUTO-ENTMCNC: 32.8 G/DL (ref 32–36)
MCV RBC AUTO: 97 FL (ref 80–100)
MONOCYTES # BLD AUTO: 0.44 X10*3/UL (ref 0.1–1)
MONOCYTES NFR BLD AUTO: 9.5 %
NEUTROPHILS # BLD AUTO: 1.94 X10*3/UL (ref 1.2–7.7)
NEUTROPHILS NFR BLD AUTO: 42.1 %
NITRITE UR QL STRIP.AUTO: NEGATIVE
NON HDL CHOLESTEROL: 144 MG/DL (ref 0–149)
NRBC BLD-RTO: 0 /100 WBCS (ref 0–0)
PH UR STRIP.AUTO: 6 [PH]
PLATELET # BLD AUTO: 180 X10*3/UL (ref 150–450)
POTASSIUM SERPL-SCNC: 4.7 MMOL/L (ref 3.5–5.3)
PROT SERPL-MCNC: 6.8 G/DL (ref 6.4–8.2)
PROT UR STRIP.AUTO-MCNC: NEGATIVE MG/DL
PSA SERPL-MCNC: 0.55 NG/ML
RBC # BLD AUTO: 5.15 X10*6/UL (ref 4.5–5.9)
RBC # UR STRIP.AUTO: NEGATIVE MG/DL
SODIUM SERPL-SCNC: 138 MMOL/L (ref 136–145)
SP GR UR STRIP.AUTO: 1.02
TRIGL SERPL-MCNC: 130 MG/DL (ref 0–149)
TSH SERPL-ACNC: 0.98 MIU/L (ref 0.44–3.98)
UROBILINOGEN UR STRIP.AUTO-MCNC: NORMAL MG/DL
VLDL: 26 MG/DL (ref 0–40)
WBC # BLD AUTO: 4.6 X10*3/UL (ref 4.4–11.3)

## 2025-04-15 PROCEDURE — 86141 C-REACTIVE PROTEIN HS: CPT

## 2025-04-15 PROCEDURE — 80053 COMPREHEN METABOLIC PANEL: CPT

## 2025-04-15 PROCEDURE — 84443 ASSAY THYROID STIM HORMONE: CPT

## 2025-04-15 PROCEDURE — 81003 URINALYSIS AUTO W/O SCOPE: CPT

## 2025-04-15 PROCEDURE — 36415 COLL VENOUS BLD VENIPUNCTURE: CPT

## 2025-04-15 PROCEDURE — 82306 VITAMIN D 25 HYDROXY: CPT

## 2025-04-15 PROCEDURE — 84153 ASSAY OF PSA TOTAL: CPT

## 2025-04-15 PROCEDURE — 80061 LIPID PANEL: CPT

## 2025-04-15 PROCEDURE — 85025 COMPLETE CBC W/AUTO DIFF WBC: CPT

## 2025-04-15 PROCEDURE — 83036 HEMOGLOBIN GLYCOSYLATED A1C: CPT

## 2025-04-18 ENCOUNTER — APPOINTMENT (OUTPATIENT)
Dept: PRIMARY CARE | Facility: CLINIC | Age: 65
End: 2025-04-18
Payer: COMMERCIAL

## 2025-04-18 VITALS
SYSTOLIC BLOOD PRESSURE: 118 MMHG | WEIGHT: 158 LBS | OXYGEN SATURATION: 97 % | HEART RATE: 54 BPM | BODY MASS INDEX: 23.4 KG/M2 | DIASTOLIC BLOOD PRESSURE: 64 MMHG | HEIGHT: 69 IN

## 2025-04-18 DIAGNOSIS — Z00.00 WELLNESS EXAMINATION: Primary | ICD-10-CM

## 2025-04-18 DIAGNOSIS — R93.1 AGATSTON CORONARY ARTERY CALCIUM SCORE BETWEEN 200 AND 399: ICD-10-CM

## 2025-04-18 DIAGNOSIS — J30.1 SEASONAL ALLERGIC RHINITIS DUE TO POLLEN: ICD-10-CM

## 2025-04-18 DIAGNOSIS — I25.10 CORONARY ARTERIOSCLEROSIS: ICD-10-CM

## 2025-04-18 DIAGNOSIS — L57.0 ACTINIC KERATOSES: ICD-10-CM

## 2025-04-18 DIAGNOSIS — E55.9 VITAMIN D DEFICIENCY: ICD-10-CM

## 2025-04-18 DIAGNOSIS — K21.9 GASTROESOPHAGEAL REFLUX DISEASE, UNSPECIFIED WHETHER ESOPHAGITIS PRESENT: ICD-10-CM

## 2025-04-18 PROBLEM — H52.13 BILATERAL MYOPIA: Status: RESOLVED | Noted: 2023-02-02 | Resolved: 2025-04-18

## 2025-04-18 PROBLEM — H52.203 ASTIGMATISM, BILATERAL: Status: RESOLVED | Noted: 2023-02-02 | Resolved: 2025-04-18

## 2025-04-18 PROBLEM — H93.19 SUBJECTIVE TINNITUS: Status: RESOLVED | Noted: 2023-02-02 | Resolved: 2025-04-18

## 2025-04-18 PROBLEM — H52.7 REFRACTIVE ERROR: Status: RESOLVED | Noted: 2023-02-02 | Resolved: 2025-04-18

## 2025-04-18 PROBLEM — H61.20 IMPACTED CERUMEN: Status: RESOLVED | Noted: 2023-02-02 | Resolved: 2025-04-18

## 2025-04-18 PROBLEM — J30.2 SEASONAL ALLERGIC RHINITIS: Status: ACTIVE | Noted: 2023-02-02

## 2025-04-18 PROBLEM — H52.4 BILATERAL PRESBYOPIA: Status: RESOLVED | Noted: 2023-02-02 | Resolved: 2025-04-18

## 2025-04-18 PROBLEM — H43.811 PVD (POSTERIOR VITREOUS DETACHMENT), RIGHT EYE: Status: RESOLVED | Noted: 2023-02-02 | Resolved: 2025-04-18

## 2025-04-18 PROCEDURE — UHSPHYS PR UH SELECT PHYSICAL: Performed by: INTERNAL MEDICINE

## 2025-04-18 PROCEDURE — 3008F BODY MASS INDEX DOCD: CPT | Performed by: INTERNAL MEDICINE

## 2025-04-18 RX ORDER — ACETYLCYSTEINE 600 MG
1 CAPSULE ORAL DAILY
Start: 2025-04-18

## 2025-04-18 RX ORDER — THIAMINE HCL 100 MG
1 CAPSULE ORAL DAILY
Start: 2025-04-18

## 2025-04-18 RX ORDER — LORATADINE 10 MG/1
10 TABLET ORAL DAILY
Start: 2025-04-18 | End: 2025-07-17

## 2025-04-18 RX ORDER — UBIQUINOL 100 MG
1 CAPSULE ORAL DAILY
Start: 2025-04-18

## 2025-04-18 ASSESSMENT — PAIN SCALES - GENERAL: PAINLEVEL_OUTOF10: 0-NO PAIN

## 2025-04-18 ASSESSMENT — ENCOUNTER SYMPTOMS
PALPITATIONS: 0
FREQUENCY: 0
DYSURIA: 0
COUGH: 0
SHORTNESS OF BREATH: 0
FATIGUE: 0
NERVOUS/ANXIOUS: 0
EYE PAIN: 0
HEADACHES: 0
HEMATURIA: 0
DYSPHORIC MOOD: 0
CONSTIPATION: 0
SORE THROAT: 0
ARTHRALGIAS: 0
ABDOMINAL PAIN: 0
NECK PAIN: 0
RHINORRHEA: 0
DIARRHEA: 0
SLEEP DISTURBANCE: 0
DIZZINESS: 0
BACK PAIN: 0
WHEEZING: 0
LIGHT-HEADEDNESS: 0

## 2025-04-18 NOTE — PROGRESS NOTES
Subjective   Patient ID: Vahe Phillips is a 64 y.o. male who presents for Annual Exam ([facility] ).    Wellness exam/physical  (Former patient of Dr. Dominique, first visit to [facility])    Coronary arteriosclerosis  Hyperlipidemia  Patient leads active lifestyle.  Previous CT calcium scoring in July 2024 was 239.  Patient referred to cardiology, Dr. Ye.  Statin therapy suggested/recommended, though patient has pursued lifestyle measures with healthier diet, regular exercise.  He is also started supplements including ubiquinol/niacin/soybean supplement.  Lipid panel reviewed which shows improvement in triglycerides, though LDL has increased to 118.  Rationale for statin therapy discussed and patient may be more agreeable.  He would like to discuss with Dr. Ye.    Vitamin D deficiency  Patient is on vitamin D supplementation and current vitamin D level is 62    Seasonal allergic rhinitis  Typically symptoms occur in the spring with sneezing and runny nose.  Over-the-counter Claritin as needed is effective    Actinic keratoses  Previous treatments per dermatology, sees Dr. Bower    GERD/heartburn  Currently, symptoms fairly well-controlled.  He does not like to take medications regularly.  He is alternating between omeprazole, Pepcid, Tums as needed.  Chart indicates that he has previous history of eosinophilic esophagitis.  He follows with Dr. Josep Mcmillan, F gastroenterology.    Health maintenance  Exercise: Weightlifting, yoga, walking, exercise classes at [employer] [facility], 4 days a week  Colon cancer screening: Next colonoscopy due in 2026 with Dr. Castro  Ophthalmology: Dr. Conti, up-to-date  Dermatology: See above  Dentistry: Up-to-date    Social  , 3 sons, 1 grandson.  Still working full-time.         Review of Systems   Constitutional:  Negative for fatigue.   HENT:  Positive for hearing loss. Negative for postnasal drip, rhinorrhea and sore throat.    Eyes:  Negative for  "pain and visual disturbance.   Respiratory:  Negative for cough, shortness of breath and wheezing.    Cardiovascular:  Negative for chest pain, palpitations and leg swelling.   Gastrointestinal:  Negative for abdominal pain, constipation and diarrhea.   Genitourinary:  Negative for dysuria, frequency, hematuria and urgency.   Musculoskeletal:  Negative for arthralgias, back pain and neck pain.   Neurological:  Negative for dizziness, light-headedness and headaches.   Psychiatric/Behavioral:  Negative for dysphoric mood and sleep disturbance. The patient is not nervous/anxious.        Objective   /64 (BP Location: Left arm, Patient Position: Sitting, BP Cuff Size: Adult)   Pulse 54   Ht 1.753 m (5' 9\")   Wt 71.7 kg (158 lb)   SpO2 97%   BMI 23.33 kg/m²     Physical Exam  Vitals reviewed.   Constitutional:       Appearance: Normal appearance.   HENT:      Head: Normocephalic.      Right Ear: Tympanic membrane normal.      Left Ear: Tympanic membrane normal.      Mouth/Throat:      Mouth: Mucous membranes are moist.      Pharynx: No oropharyngeal exudate or posterior oropharyngeal erythema.   Eyes:      Conjunctiva/sclera: Conjunctivae normal.   Cardiovascular:      Rate and Rhythm: Normal rate and regular rhythm.      Heart sounds: No murmur heard.  Pulmonary:      Effort: Pulmonary effort is normal.      Breath sounds: Normal breath sounds.   Abdominal:      General: Bowel sounds are normal.      Palpations: Abdomen is soft.      Tenderness: There is no abdominal tenderness.   Genitourinary:     Prostate: Normal.   Musculoskeletal:         General: Normal range of motion.      Right lower leg: No edema.      Left lower leg: No edema.   Lymphadenopathy:      Cervical: No cervical adenopathy.   Skin:     General: Skin is warm.   Neurological:      General: No focal deficit present.      Mental Status: He is alert and oriented to person, place, and time.   Psychiatric:         Mood and Affect: Mood normal. "         Assessment/Plan     Wellness exam/physical  Continued regular exercises recommended with goal of 120-150 minutes/week  Continued well-balanced diet rich in vegetables, fruit, fiber, lean protein recommended  Continued routine follow-up with optometry recommended, scheduled  Continued routine follow-up with dentistry recommended  Vaccines recommended include:  Prevnar-20, Tdap, Shingrix, annual influenza  Continued screening colonoscopies per Dr. Jerome recommended, up-to-date    Coronary arteriosclerosis  Hyperlipidemia  Continued healthy lifestyle including well-balanced diet and regular aerobic exercise is recommended  Statin cholesterol-lowering medication recommended and patient will consider, contact his cardiologist, Dr. Ye, regarding medication choice/dosing  Referral to nutritionist for consultation    Vitamin D deficiency  Continue current vitamin D supplementation (patient to contact office with current dose)    Seasonal allergic rhinitis  Continue Claritin 10 mg daily as needed    Actinic keratoses  Continue routine follow-up with dermatology for annual full-body skin exams  Limiting sun exposure and using sunscreen regularly is recommended    GERD/heartburn  Maintaining proper diet limiting intake of spicy or acidic foods, caffeine, alcohol, larger late evening meals is recommended  Continuing current use of omeprazole/Pepcid/Tums as needed per GI recommendations recommended    Follow-up  Wellness exam/physical in 1 year  (Fasting lab work will be ordered to complete 3 to 7 days prior to visit)    Jose Hollingsworth MD

## 2025-04-18 NOTE — Clinical Note
Alon, I hope that you are doing well.  I saw Vahe today for first visit. He appears more agreeable to starting statin therapy.  You had recommended rosuvastatin 10 mg 3 days a week. He indicated that he would reach out to you for your thoughts and opinion.  He last saw you in September. Thank you and have a great weekend. Sean

## 2025-04-18 NOTE — PATIENT INSTRUCTIONS
Wellness exam/physical  Continued regular exercises recommended with goal of 120-150 minutes/week  Continued well-balanced diet rich in vegetables, fruit, fiber, lean protein recommended  Continued routine follow-up with optometry recommended, scheduled  Continued routine follow-up with dentistry recommended  Vaccines recommended include:  Prevnar-20, Tdap, Shingrix, annual influenza  Continued screening colonoscopies per Dr. Jerome recommended, up-to-date    Coronary arteriosclerosis  Hyperlipidemia  Continued healthy lifestyle including well-balanced diet and regular aerobic exercise is recommended  Statin cholesterol-lowering medication recommended and patient will consider, contact his cardiologist, Dr. Ye, regarding medication choice/dosing  Referral to nutritionist for consultation    Vitamin D deficiency  Continue current vitamin D supplementation (patient to contact office with current dose)    Seasonal allergic rhinitis  Continue Claritin 10 mg daily as needed    Actinic keratoses  Continue routine follow-up with dermatology for annual full-body skin exams  Limiting sun exposure and using sunscreen regularly is recommended    GERD/heartburn  Maintaining proper diet limiting intake of spicy or acidic foods, caffeine, alcohol, larger late evening meals is recommended  Continuing current use of omeprazole/Pepcid/Tums as needed per GI recommendations recommended    Follow-up  Wellness exam/physical in 1 year  (Fasting lab work will be ordered to complete 3 to 7 days prior to visit)

## 2025-04-28 ENCOUNTER — TELEPHONE (OUTPATIENT)
Dept: CARDIOLOGY | Facility: CLINIC | Age: 65
End: 2025-04-28
Payer: COMMERCIAL

## 2025-04-28 DIAGNOSIS — R93.1 ELEVATED CORONARY ARTERY CALCIUM SCORE: ICD-10-CM

## 2025-04-28 DIAGNOSIS — E78.5 HYPERLIPIDEMIA LDL GOAL <70: Primary | ICD-10-CM

## 2025-04-28 RX ORDER — ROSUVASTATIN CALCIUM 10 MG/1
10 TABLET, COATED ORAL
Qty: 12 TABLET | Refills: 11 | Status: SHIPPED | OUTPATIENT
Start: 2025-04-28 | End: 2026-04-28

## 2025-04-28 NOTE — TELEPHONE ENCOUNTER
Patient seen in Sept 2024 for elevated CT calcium score. Recommended rosuvastatin 10mg 3 days a week for goal LDL<70. Patient wanted to try diet and exercise. Recent . Patient requesting script for rosuva. Message sent to patient regarding script, follow up lipid panel with office visit in 3 mos. Orders cued and pended

## 2025-05-11 ENCOUNTER — PATIENT MESSAGE (OUTPATIENT)
Dept: PRIMARY CARE | Facility: CLINIC | Age: 65
End: 2025-05-11
Payer: COMMERCIAL

## 2025-05-11 DIAGNOSIS — J20.9 ACUTE BRONCHITIS, UNSPECIFIED ORGANISM: ICD-10-CM

## 2025-05-11 DIAGNOSIS — Z91.018 NUT ALLERGY: Primary | ICD-10-CM

## 2025-05-11 RX ORDER — AZITHROMYCIN 250 MG/1
TABLET, FILM COATED ORAL
Qty: 6 TABLET | Refills: 0 | Status: SHIPPED | OUTPATIENT
Start: 2025-05-11 | End: 2025-05-16

## 2025-05-11 RX ORDER — EPINEPHRINE 0.3 MG/.3ML
1 INJECTION SUBCUTANEOUS AS NEEDED
Qty: 2 EACH | Refills: 0 | Status: SHIPPED | OUTPATIENT
Start: 2025-05-11 | End: 2026-05-11

## 2025-05-28 ENCOUNTER — APPOINTMENT (OUTPATIENT)
Dept: PRIMARY CARE | Facility: CLINIC | Age: 65
End: 2025-05-28
Payer: COMMERCIAL

## 2025-05-28 VITALS — HEIGHT: 69 IN | BODY MASS INDEX: 23.4 KG/M2 | WEIGHT: 158 LBS

## 2025-05-28 DIAGNOSIS — I25.10 CORONARY ARTERIOSCLEROSIS: ICD-10-CM

## 2025-05-28 PROCEDURE — NUTCO NUTRITION CONSULTATION: Performed by: DIETITIAN, REGISTERED

## 2025-05-28 NOTE — PROGRESS NOTES
It was a pleasure meeting Mr. Vahe Phillips for an initial nutrition assessment at [facility] to discuss diet and nutrition as part of the  [facility]. Patient referred for   1. Coronary arteriosclerosis  Referral to Nutrition Services      .      Nutrition Assessment    Problem List[1]    Nutrition History:  Food & Nutrition History:   He is trying to stay heart healthy.  He allergic to nuts.    He reports eating 3 meals a day.  He does not typically snack.  He make snack in the evenings on the weekends.  He will snack on hummus and ivet chips.      Food Allergies: Nuts;  Food Intolerances: None  Vitamin/mineral intake: Fish oil, Vitamin D, occasionally Vitamin C  Herbal supplements: None  Medication and Complementary/Alternative Medicine Use: None  GI Symptoms: GI Symptoms : reflux  He has a bowel movement everyday  Mouth Issues: Oral Problems: denies; Teeth Issues: Dentition : own  Sleep Habits: 7-8 hours a night.  No issues falling asleep.  His sleep is disrupted.  He gets up to go to the bathroom once a night.  He goes to bed 11/11:30pm and wakes 7am.    Diet Recall:  24 Hour Food Recall  Meal 1: 7:30am - Greek yogurt, blackberries, blueberries  Meal 2: 1:00pm - turkey sandwich on white bread, lettuce, mustard  Meal 3: 5:30pm - flank steak, sweet potato, mixed vegetables-peppers, zucchini      Additional Nutrition History:   Food Variety: present  Oral Nutrition Supplement Use: None   Fluid Intake: water-3-5 16oz glasses a day; apple juice-2-3 days a week (4oz); soda water/sparkling water-2-3 days a week; alcohol-4-8 drinks a week; Nooma rehydration drinks-3-4 times a week    Food Preparation:  Cooking: Partner/Spouse  Grocery Shopping: Partner/Spouse  Dining Out: 1 to 3 times a week    Physical Activity:   Exercises 5 days a week  Walking/treadmill, 2 days a week, 60-75 minutes  Fitness classes that includes cardio and weights, 4 days a week, 45 minutes  Yoga one day a  "week    Anthropometrics:  Height: 175.3 cm (5' 9\")   Weight: 71.7 kg (158 lb)   BMI (Calculated): 23.32             DBW: 160-162lbs    Weight History:   Daily Weight  05/28/25 : 71.7 kg (158 lb)  04/18/25 : 71.7 kg (158 lb)  09/18/24 : 72.6 kg (160 lb)  04/16/24 : 73.5 kg (162 lb)  03/16/23 : 73.5 kg (162 lb)  11/10/21 : 71.7 kg (158 lb)  12/01/20 : 72.6 kg (160 lb)  09/11/20 : 72.3 kg (159 lb 8 oz)  02/17/20 : 70.4 kg (155 lb 2 oz)         Nutrition Significant Labs:  CMP Trend:    Recent Labs     04/15/25  0903 04/16/24  0821 03/16/23  0817 08/18/21  1516 09/11/20  0904   GLUCOSE 101* 105* 91 131* 89    142 141 133* 139   K 4.7 4.6 4.3 4.2 4.7    105 101 97* 104   CO2 29 29 31 27 27   ANIONGAP 11 13 13 13 13   BUN 16 15 16 12 14   CREATININE 1.06 1.03 1.14 0.76 0.99   EGFR 78 82  --   --   --    CALCIUM 9.4 9.1 10.0 8.1* 9.1   ALBUMIN 4.4  --   --  3.2* 4.2   ALKPHOS 53  --   --  44 55   PROT 6.8  --   --  6.9 6.7   AST 21  --   --  43* 22   BILITOT 0.7  --   --  1.1 0.5   ALT 23  --   --  37 29   , DM Specific Labs Trend (Includes HgbA1C, antibodies & fasting insulin):   Recent Labs     04/15/25  0903   HGBA1C 4.9   , Lipid Panel Trend:    Recent Labs     04/15/25  0903 04/16/24  0821 03/16/23  0817 11/10/21  0840 09/11/20  0904   CHOL 191 182 178 215* 168   HDL 47.4 42.6 44.3 53.0 46.6   LDLCALC 118* 93  --   --   --    LDLF  --   --  82 116* 95   VLDL 26 46* 52* 46* 27   TRIG 130 232* 258* 232* 133   , Vitamin B12: No results found for: \"VDOXEFTK23\" , Vitamin D:   Lab Results   Component Value Date    VITD25 62 04/15/2025    , and CRP Trend (last 3):   Lab Results   Component Value Date    HSCRP 0.3 04/15/2025        Medications:  Current Outpatient Medications   Medication Instructions    coQ10 (ubiquinol) 100 mg, oral, Daily    EPINEPHrine (EPIPEN) 0.3 mg, intramuscular, As needed, Call 911 after use.    fish oil (Omega-3) 60- mg capsule 1,000 mg, oral, Daily    loratadine (CLARITIN) 10 mg, " oral, Daily, As needed for allergies    niacin 100 mg capsule 1 capsule, oral, Daily    omeprazole (PriLOSEC) 20 mg DR capsule 1 capsule, Daily    rosuvastatin (CRESTOR) 10 mg, oral, Every Mon/Wed/Fri    soybean, fermented (Nattokinase) 50 mg capsule 1 capsule, oral, Daily        Estimated Needs:  Total Energy Estimated Needs in 24 hours (kCal): 2098 kCal; Method for Estimating Needs: Menard St. Jeor x 1.4 activity factor    Total Protein Estimated Needs in 24 Hours (g): 110 g; Method for Estimating 24 Hour Protein Needs: 0.7g/lb, 158lbs     Total Fiber Estimated Needs (g): 35 g         Nutrition Diagnosis   Malnutrition Diagnosis  Patient has Malnutrition Diagnosis: No    Nutrition Diagnosis  Patient has Nutrition Diagnosis: Yes  Diagnosis Status (1): New  Nutrition Diagnosis 1: Inadequate fiber intake  Related to (1): food- and nutrition-related knowledge deficit regarding appropriate fiber intake  As Evidenced by (1): food recall showing patient is not meeting the recommended 35-40g fiber per day  Additional Nutrition Diagnosis: Diagnosis 2  Diagnosis Status (2): New  Nutrition Diagnosis 2: Inadequate protein intake  Related to (2): food- and nutrition-related knowledge deficit regarding appropriate protein intake  As Evidenced by (2): food recall showing patient is not meeting the recommended 110g protein per day       Nutrition Interventions/Recommendations   Nutrition Prescription: Oral nutrition general healthy diet, increased fiber diet, increased protein diet    Nutrition Interventions:   Food and Nutrient Delivery: Meals & Snacks: Fiber-modified diet, Protein-modified diet       Nutrition Education:   Nutrition Education Content: Content related nutrition education   Increased Fiber Diet, Increased Protein Diet    Nutrition Recommendations:   1) To help create well balanced meals that stabilize blood sugar, use the plate model for portioning out your meals.  Fill 1/2 of your plate with non-starchy  vegetables, 1/4 of your plate with lean protein (~4-6oz per meal), and 1/4 of your plate with complex carbohydrates/whole grains.  Each meal should contain the following 3 components: protein + fiber + healthy fats.    2) Aim for 35-40g fiber daily.  One way to help you reach this goal is to include non-starchy vegetables with both lunch and dinner (at least 1-2 cups).  Be sure to include a wide variety of colorful vegetables throughout the week.  Also, be sure to use 100% whole wheat/whole grain breads/pastas, brown/wild rice, quinoa, oats, beans, lentils, starchy vegetables-potatoes, sweet potatoes, corn, peas, winter squash and limit/avoid white, processed carbohydrates (white bread, white pasta, white rice, etc).    3) Choose 3 out of 5 of the following daily to help you reach your daily fiber goals:  -1 cup berries (4-5g fiber)  -1/2 cup beans (7g fiber)  -1/4 cup nuts/seeds (5g fiber) - sunflower, pumpkin, romario, flax, hemp  -1/3 avocado (4g fiber)  -3 cups dark leafy greens (5g fiber)    4) Ensure you are getting adequate amounts of protein consistently throughout the day.  Your daily protein goal is 110g.  Aim for 30-40g per meal and include 5-10g protein at snacks.    5) Options for breakfast to boost protein and fiber at this meal:  -homemade smoothie made with protein powder (recommended brands: Naked Whey, OWYN, Truvani) + berries + greens + source of healthy fats (seeds-romario/flax, OR avocado OR sunflower seed butter)  -2 eggs + 1/4 cup shredded cheese + chicken sausage ([employer]) + veggies + avocado  -Greek yogurt + berries + 1 Tbsp romario seeds + 1/2 cup Hilda Crunch cereal    6) At lunch, be sure to add ~4oz of protein to your sandwiches or salad to provide adequate protein at this meal.  Also, be sure to include a variety of non-starchy vegetables to this meal.       Educational Handouts Provided: Use a Plate to Plan Your Meals    Nutrition Counseling:   Nutrition Counseling Strategies  : Nutrition counseling based on goal setting strategy         Nutrition Monitoring and Evaluation   Food and Nutrient Intake  Monitoring and Evaluation Plan: Protein intake, Fiber intake  Estimated protein intake: Estimated protein intake  Criteria: monitor patient's progress towards protein goal  Estimated fiber intake: Estimated fiber intake  Criteria: monitor patient's progress towards fiber goal                        Goal Status: Goal Status: New goal identified         Follow Up: Patient will contact Doctor's MA to schedule follow up appointment          [1]   Patient Active Problem List  Diagnosis    Seasonal allergic rhinitis    Bilateral sensorineural hearing loss    Gastroesophageal reflux disease with esophagitis    Cochlear hydrops, left    Epiretinal membrane (ERM) of both eyes    GERD (gastroesophageal reflux disease)    Macular pucker, right eye    Myopia of both eyes with astigmatism and presbyopia    PVD (posterior vitreous detachment), both eyes    Sensorineural hearing loss (SNHL) of left ear with restricted hearing of right ear    Subjective tinnitus of left ear    Vertigo    Diverticulosis of large intestine without hemorrhage    Eosinophilic esophagitis    Cochlear hydrops of left ear    History of prostatitis    Agatston coronary artery calcium score between 200 and 399    Coronary arteriosclerosis    Vitamin D deficiency

## 2025-06-19 ENCOUNTER — APPOINTMENT (OUTPATIENT)
Dept: OPHTHALMOLOGY | Age: 65
End: 2025-06-19
Payer: COMMERCIAL

## 2025-06-19 DIAGNOSIS — H35.373 EPIRETINAL MEMBRANE (ERM) OF BOTH EYES: Primary | ICD-10-CM

## 2025-06-19 DIAGNOSIS — H04.123 DRY EYES, BILATERAL: ICD-10-CM

## 2025-06-19 PROCEDURE — 99213 OFFICE O/P EST LOW 20 MIN: CPT | Performed by: OPTOMETRIST

## 2025-06-19 PROCEDURE — 83861 MICROFLUID ANALY TEARS: CPT | Performed by: OPTOMETRIST

## 2025-06-19 PROCEDURE — 92134 CPTRZ OPH DX IMG PST SGM RTA: CPT | Performed by: OPTOMETRIST

## 2025-06-19 PROCEDURE — 83516 IMMUNOASSAY NONANTIBODY: CPT | Performed by: OPTOMETRIST

## 2025-06-19 ASSESSMENT — ENCOUNTER SYMPTOMS
MUSCULOSKELETAL NEGATIVE: 0
CARDIOVASCULAR NEGATIVE: 0
ALLERGIC/IMMUNOLOGIC NEGATIVE: 0
NEUROLOGICAL NEGATIVE: 0
HEMATOLOGIC/LYMPHATIC NEGATIVE: 0
PSYCHIATRIC NEGATIVE: 0
ENDOCRINE NEGATIVE: 0
RESPIRATORY NEGATIVE: 0
EYES NEGATIVE: 1
CONSTITUTIONAL NEGATIVE: 0
GASTROINTESTINAL NEGATIVE: 0

## 2025-06-19 ASSESSMENT — REFRACTION_MANIFEST
OD_AXIS: 080
OD_ADD: +2.50
OD_SPHERE: PLANO
OS_AXIS: 155
OS_ADD: +2.50
OS_SPHERE: -0.25
OD_CYLINDER: -0.50
OS_CYLINDER: -0.50

## 2025-06-19 ASSESSMENT — VISUAL ACUITY
OD_SC+: -1
OS_SC: 20/20
OD_BAT_MED: 20/20
METHOD: SNELLEN - LINEAR
OS_SC+: -2
OS_BAT_MED: 20/20
OD_SC: 20/20

## 2025-06-19 ASSESSMENT — CUP TO DISC RATIO
OS_RATIO: .3
OD_RATIO: .3

## 2025-06-19 ASSESSMENT — SLIT LAMP EXAM - LIDS
COMMENTS: GOOD POSITION
COMMENTS: GOOD POSITION

## 2025-06-19 ASSESSMENT — EXTERNAL EXAM - RIGHT EYE: OD_EXAM: NORMAL

## 2025-06-19 ASSESSMENT — SCHIRMERS TEST
OS_SCHIRMERS: 11
OD_SCHIRMERS: 10

## 2025-06-19 ASSESSMENT — EXTERNAL EXAM - LEFT EYE: OS_EXAM: NORMAL

## 2025-06-19 NOTE — PROGRESS NOTES
VA stable at 20/20 OD and OS.  ERM stable and Optical coherence tomography of the macula revealed:    OD: ERM, abnormal foveal contour, photoreceptor, retinal pigment epithelium, IS/OS junction, central field 459 was 436 was 448 was 438 was 446 was 439 micron.  Vitreous base NOT visualized.   OS: ERM, abnormal foveal contour, photoreceptor, retinal pigment epithelium, IS/OS junction, central field 433 was 423 was 427 was 404 was 388 was 380 micron.  Vitreous base NOT visualized.     VA tests at 20/20 OD/OS, 20/20.  Mild subjective symptoms associated.      NS minimal.  A spectacle prescription was deferred at the patient`s request.  Patient may not fill as he sees well without.  The patient was asked to return to our clinic in one year   or sooner if ocular or vision changes occur.     C/O dry eyes start iVizia 2-4x/day.     Blepharitis: Meibomain gland disease (Effron scale 0-4, 0:no abnormality, 4: thick creamy yellow expression at all gland orifices, expression continuous, conjunctival redness). Collarettes (0-4, 0: 0-2 lashes with collarettes, 1: 3-10, 2: >10 but <1/3rd of lashes, 3: >1/3rd to <2/3rd of lashes, 4: >2/3rd of lashes.  Right eye (OD): stage 2 collarettes: stage 0 was 2  Left eye (OS): stage 2 collarettes: stage 0 was 2    Start XDEMVY. Start iVizia at least BID.    Testing to evaluate the presence of dry eye disease (DED) was performed today and provided the following results:  The ocular surface disease index questionnaire (OSDI) score was  (scale: 0-12 = normal, 13-22 = mild, 23-32 = moderate, >33 = severe): 17  TearLab, a test for tear film osmolarity revealed (normal <300 mOsm/L, mild 300-320, moderate 320-340, severe >340 mOsm/L, inter eye difference of >8 mOsm/L is considered abnormal as well)  OD:  298 mOsm/L  OS:  316 mOsm/L  Inflammadry, a test for the DED specific MMP-9 inhibitor:  OD:  negative  OS:  negative  Schirmer's test with anesthetic, testing basal tear production (0-5 = severe,  6-10 = moderate, 11-15 = mild):  OD:  10 mm between 1-6 minutes  OS:  11 mm between 1-6 minutes  Tear break up time (TBUT), a measure of tear stability (0-5 = severe, 6-10 = moderate, 11-15 = mild)  OD:  8 seconds  OS:  8 seconds  Corneal punctate epithelial erosions (PEE) staining with sodium fluorescein score (5 zones, each PEE level 0-3, maximum score = 15)  OD: NIH PEE score: +1 I  Central PEE absent  OS: NIH PEE score:  +1I  Central PEE absent     Possible that ocular allergioes play a role as well.     Use PFATs BID, Start ZOE Gray every day      6 months VA manifest refraction (MR) BAT DFE and OCT macula. Dry eye testing if this has gotten worse.

## 2025-07-28 DIAGNOSIS — R93.1 ELEVATED CORONARY ARTERY CALCIUM SCORE: ICD-10-CM

## 2025-07-28 DIAGNOSIS — E78.5 HYPERLIPIDEMIA LDL GOAL <70: ICD-10-CM

## 2025-08-09 LAB
CHOLEST SERPL-MCNC: 164 MG/DL
CHOLEST/HDLC SERPL: 3.1 (CALC)
HDLC SERPL-MCNC: 53 MG/DL
LDLC SERPL CALC-MCNC: 90 MG/DL (CALC)
NONHDLC SERPL-MCNC: 111 MG/DL (CALC)
TRIGL SERPL-MCNC: 110 MG/DL

## 2025-08-27 ENCOUNTER — OFFICE VISIT (OUTPATIENT)
Dept: CARDIOLOGY | Facility: CLINIC | Age: 65
End: 2025-08-27
Payer: COMMERCIAL

## 2025-08-27 VITALS
OXYGEN SATURATION: 95 % | BODY MASS INDEX: 22.89 KG/M2 | DIASTOLIC BLOOD PRESSURE: 68 MMHG | WEIGHT: 155 LBS | SYSTOLIC BLOOD PRESSURE: 112 MMHG | HEART RATE: 62 BPM

## 2025-08-27 DIAGNOSIS — E78.5 HYPERLIPIDEMIA LDL GOAL <70: ICD-10-CM

## 2025-08-27 DIAGNOSIS — R93.1 ELEVATED CORONARY ARTERY CALCIUM SCORE: Primary | ICD-10-CM

## 2025-08-27 PROCEDURE — 99213 OFFICE O/P EST LOW 20 MIN: CPT | Performed by: INTERNAL MEDICINE

## 2025-08-27 PROCEDURE — 99212 OFFICE O/P EST SF 10 MIN: CPT

## 2025-08-27 PROCEDURE — 1036F TOBACCO NON-USER: CPT | Performed by: INTERNAL MEDICINE

## 2025-12-18 ENCOUNTER — APPOINTMENT (OUTPATIENT)
Dept: OPHTHALMOLOGY | Age: 65
End: 2025-12-18
Payer: COMMERCIAL

## 2026-04-22 ENCOUNTER — APPOINTMENT (OUTPATIENT)
Dept: PRIMARY CARE | Facility: CLINIC | Age: 66
End: 2026-04-22
Payer: COMMERCIAL